# Patient Record
Sex: MALE | Race: BLACK OR AFRICAN AMERICAN | NOT HISPANIC OR LATINO | URBAN - METROPOLITAN AREA
[De-identification: names, ages, dates, MRNs, and addresses within clinical notes are randomized per-mention and may not be internally consistent; named-entity substitution may affect disease eponyms.]

---

## 2017-09-05 ENCOUNTER — EMERGENCY (EMERGENCY)
Facility: HOSPITAL | Age: 32
LOS: 1 days | Discharge: LEFT BEFORE DISCHARGE | End: 2017-09-05
Admitting: EMERGENCY MEDICINE

## 2017-09-05 VITALS
OXYGEN SATURATION: 100 % | DIASTOLIC BLOOD PRESSURE: 67 MMHG | HEART RATE: 59 BPM | SYSTOLIC BLOOD PRESSURE: 121 MMHG | RESPIRATION RATE: 16 BRPM | TEMPERATURE: 98 F

## 2017-09-05 NOTE — ED ADULT TRIAGE NOTE - CHIEF COMPLAINT QUOTE
pt comes to ED for L foot pain x 1 month. pt was lifting something heavy and ever since then had foot pain. pt is ambulatory to triage pt went to urgent care had xray. pt denies going to podiatrist pt VSS pt appears comfortable NAD

## 2018-02-24 ENCOUNTER — EMERGENCY (EMERGENCY)
Facility: HOSPITAL | Age: 33
LOS: 1 days | Discharge: HOME | End: 2018-02-24
Attending: EMERGENCY MEDICINE | Admitting: EMERGENCY MEDICINE
Payer: COMMERCIAL

## 2018-02-24 VITALS
RESPIRATION RATE: 18 BRPM | HEART RATE: 78 BPM | TEMPERATURE: 98 F | SYSTOLIC BLOOD PRESSURE: 137 MMHG | DIASTOLIC BLOOD PRESSURE: 74 MMHG | OXYGEN SATURATION: 100 %

## 2018-02-24 PROCEDURE — 73630 X-RAY EXAM OF FOOT: CPT | Mod: 26,RT

## 2018-02-24 PROCEDURE — 99283 EMERGENCY DEPT VISIT LOW MDM: CPT

## 2018-02-24 RX ORDER — IBUPROFEN 200 MG
400 TABLET ORAL ONCE
Qty: 0 | Refills: 0 | Status: COMPLETED | OUTPATIENT
Start: 2018-02-24 | End: 2018-02-24

## 2018-02-24 RX ADMIN — Medication 400 MILLIGRAM(S): at 14:40

## 2018-02-24 NOTE — ED ADULT TRIAGE NOTE - CHIEF COMPLAINT QUOTE
pt has chronic  ongoing problem with left foot and knee states due to old injury that he  cant walk more than a block without having pain/  aslo states this week stumbed toe on opposite foot and now hs numbness to toe next to that one

## 2018-02-24 NOTE — ED PROVIDER NOTE - OBJECTIVE STATEMENT
31 y/o M with h/o right foot pain after he banged it 4 days ago at home and now has pain over his 3rd toe , no bleeding and the base of his 2nd and 3rd toe is numb since then. Pt additionally complains about chronic pain over left fore foot plantar aspect and left knee pain for months after he had a sudden stop at job while lifting something heavy. Pt has not seen a podiatrist and had xray of left foot and knee at [facility] urgent care and was negative. Pt has been able to walk since then

## 2018-02-24 NOTE — ED PROVIDER NOTE - PROGRESS NOTE DETAILS
Jose M MILLAN- pt feels better, ambulating well, advsied to apply ice to area of pain, apply clotrimazole cream at night for athlete foot

## 2018-02-24 NOTE — ED PROVIDER NOTE - CARE PLAN
Principal Discharge DX:	Contusion of right foot, initial encounter  Secondary Diagnosis:	Tinea pedis of both feet

## 2023-04-28 ENCOUNTER — APPOINTMENT (OUTPATIENT)
Dept: PEDIATRIC MEDICAL GENETICS | Facility: CLINIC | Age: 38
End: 2023-04-28
Payer: COMMERCIAL

## 2023-04-28 DIAGNOSIS — Z31.440 ENCOUNTER OF MALE FOR TESTING FOR GENETIC DISEASE CARRIER STATUS FOR PROCREATIVE MANAGEMENT: ICD-10-CM

## 2023-04-28 DIAGNOSIS — Z78.9 OTHER SPECIFIED HEALTH STATUS: ICD-10-CM

## 2023-04-28 DIAGNOSIS — Z31.5 ENCOUNTER FOR PROCREATIVE GENETIC COUNSELING: ICD-10-CM

## 2023-04-28 DIAGNOSIS — G47.30 SLEEP APNEA, UNSPECIFIED: ICD-10-CM

## 2023-04-28 PROCEDURE — 96040: CPT | Mod: 95

## 2023-05-07 ENCOUNTER — NON-APPOINTMENT (OUTPATIENT)
Age: 38
End: 2023-05-07

## 2023-05-07 PROBLEM — Z31.5 ENCOUNTER FOR PROCREATIVE GENETIC COUNSELING AND TESTING: Status: ACTIVE | Noted: 2023-05-07

## 2023-05-07 PROBLEM — G47.30 SLEEP APNEA IN ADULT: Status: ACTIVE | Noted: 2023-05-07

## 2023-05-07 PROBLEM — Z31.440 ENCOUNTER OF MALE FOR TESTING FOR GENETIC DISEASE CARRIER STATUS FOR PROCREATIVE MANAGEMENT: Status: ACTIVE | Noted: 2023-05-07

## 2023-05-07 PROBLEM — Z78.9 NEVER SMOKED TOBACCO: Status: ACTIVE | Noted: 2023-05-07

## 2023-05-22 ENCOUNTER — LABORATORY RESULT (OUTPATIENT)
Age: 38
End: 2023-05-22

## 2023-09-02 ENCOUNTER — NON-APPOINTMENT (OUTPATIENT)
Age: 38
End: 2023-09-02

## 2024-04-11 NOTE — ED PROVIDER NOTE - WEIGHT BEARING
Forde catheter inserted using sterile technique. Second RN Yolanda present to confirm sterility. Bedside drainage to gravity. Stat lock in place. 400 clear yellow output able
